# Patient Record
Sex: FEMALE | Race: WHITE | NOT HISPANIC OR LATINO | URBAN - METROPOLITAN AREA
[De-identification: names, ages, dates, MRNs, and addresses within clinical notes are randomized per-mention and may not be internally consistent; named-entity substitution may affect disease eponyms.]

---

## 2020-11-19 ENCOUNTER — OFFICE VISIT (OUTPATIENT)
Dept: URBAN - METROPOLITAN AREA CLINIC 80 | Facility: CLINIC | Age: 55
End: 2020-11-19
Payer: COMMERCIAL

## 2020-11-19 DIAGNOSIS — R10.13 EPIGASTRIC ABDOMINAL PAIN: ICD-10-CM

## 2020-11-19 DIAGNOSIS — E66.3 OVERWEIGHT: ICD-10-CM

## 2020-11-19 DIAGNOSIS — Z80.0 FAMILY HISTORY OF COLON CANCER: ICD-10-CM

## 2020-11-19 DIAGNOSIS — R12 HEARTBURN: ICD-10-CM

## 2020-11-19 PROCEDURE — G8427 DOCREV CUR MEDS BY ELIG CLIN: HCPCS | Performed by: INTERNAL MEDICINE

## 2020-11-19 PROCEDURE — G8484 FLU IMMUNIZE NO ADMIN: HCPCS | Performed by: INTERNAL MEDICINE

## 2020-11-19 PROCEDURE — 1036F TOBACCO NON-USER: CPT | Performed by: INTERNAL MEDICINE

## 2020-11-19 PROCEDURE — 3017F COLORECTAL CA SCREEN DOC REV: CPT | Performed by: INTERNAL MEDICINE

## 2020-11-19 PROCEDURE — 99214 OFFICE O/P EST MOD 30 MIN: CPT | Performed by: INTERNAL MEDICINE

## 2020-11-19 PROCEDURE — G8417 CALC BMI ABV UP PARAM F/U: HCPCS | Performed by: INTERNAL MEDICINE

## 2020-11-19 RX ORDER — PANTOPRAZOLE SODIUM 40 MG/1
1 TABLET TABLET, DELAYED RELEASE ORAL ONCE A DAY
Qty: 30 | OUTPATIENT
Start: 2020-11-19

## 2020-11-19 NOTE — HPI-TODAY'S VISIT:
56 yo F   October: epigastric pain, increased, tried to go to urgent care and Dr Dorantes- but went to [facility] ER, Gi cocktail allowed  empty stomach painful- however better when she had something on her stomach placed on her stomach:  Protonix- felt great- however when she stopped things started to resume, now is popping TUMS daily.    ?precipitant?  took 4 advil the night before it started?

## 2020-12-05 ENCOUNTER — OFFICE VISIT (OUTPATIENT)
Dept: URBAN - METROPOLITAN AREA SURGERY CENTER 19 | Facility: SURGERY CENTER | Age: 55
End: 2020-12-05
Payer: COMMERCIAL

## 2020-12-05 ENCOUNTER — CLAIMS CREATED FROM THE CLAIM WINDOW (OUTPATIENT)
Dept: URBAN - METROPOLITAN AREA CLINIC 4 | Facility: CLINIC | Age: 55
End: 2020-12-05
Payer: COMMERCIAL

## 2020-12-05 DIAGNOSIS — Z80.0 FAMILY HISTORY MALIGNANT NEOPLASM OF BILIARY TRACT: ICD-10-CM

## 2020-12-05 DIAGNOSIS — K21.9 ACID REFLUX: ICD-10-CM

## 2020-12-05 DIAGNOSIS — K21.00 GASTRO-ESOPHAGEAL REFLUX DISEASE WITH ESOPHAGITIS, WITHOUT BLEEDING: ICD-10-CM

## 2020-12-05 DIAGNOSIS — K31.89 OTHER DISEASES OF STOMACH AND DUODENUM: ICD-10-CM

## 2020-12-05 DIAGNOSIS — K31.89 ACQUIRED DEFORMITY OF DUODENUM: ICD-10-CM

## 2020-12-05 PROCEDURE — G9935 CANC NOT DETECTD DURING SRCN: HCPCS | Performed by: INTERNAL MEDICINE

## 2020-12-05 PROCEDURE — 43239 EGD BIOPSY SINGLE/MULTIPLE: CPT | Performed by: INTERNAL MEDICINE

## 2020-12-05 PROCEDURE — G8907 PT DOC NO EVENTS ON DISCHARG: HCPCS | Performed by: INTERNAL MEDICINE

## 2020-12-05 PROCEDURE — G0121 COLON CA SCRN NOT HI RSK IND: HCPCS | Performed by: INTERNAL MEDICINE

## 2020-12-05 PROCEDURE — 88312 SPECIAL STAINS GROUP 1: CPT | Performed by: PATHOLOGY

## 2020-12-05 PROCEDURE — 88305 TISSUE EXAM BY PATHOLOGIST: CPT | Performed by: PATHOLOGY

## 2020-12-05 RX ORDER — PANTOPRAZOLE SODIUM 40 MG/1
1 TABLET TABLET, DELAYED RELEASE ORAL ONCE A DAY
Qty: 30 | Status: ACTIVE | COMMUNITY
Start: 2020-11-19

## 2020-12-14 ENCOUNTER — ERX REFILL RESPONSE (OUTPATIENT)
Dept: URBAN - METROPOLITAN AREA CLINIC 80 | Facility: CLINIC | Age: 55
End: 2020-12-14

## 2020-12-14 RX ORDER — PANTOPRAZOLE SODIUM 40 MG/1
1 TABLET TABLET, DELAYED RELEASE ORAL ONCE A DAY
Qty: 30 | Refills: 0

## 2021-01-07 ENCOUNTER — ERX REFILL RESPONSE (OUTPATIENT)
Dept: URBAN - METROPOLITAN AREA CLINIC 80 | Facility: CLINIC | Age: 56
End: 2021-01-07

## 2021-01-07 RX ORDER — PANTOPRAZOLE SODIUM 40 MG/1
1 TABLET TABLET, DELAYED RELEASE ORAL ONCE A DAY
Qty: 30 | Refills: 0

## 2021-02-03 ENCOUNTER — ERX REFILL RESPONSE (OUTPATIENT)
Dept: URBAN - METROPOLITAN AREA CLINIC 80 | Facility: CLINIC | Age: 56
End: 2021-02-03

## 2021-02-03 RX ORDER — PANTOPRAZOLE SODIUM 40 MG/1
1 TABLET TABLET, DELAYED RELEASE ORAL ONCE A DAY
Qty: 30 | Refills: 0

## 2021-02-22 ENCOUNTER — ERX REFILL RESPONSE (OUTPATIENT)
Dept: URBAN - METROPOLITAN AREA CLINIC 80 | Facility: CLINIC | Age: 56
End: 2021-02-22

## 2021-02-22 RX ORDER — PANTOPRAZOLE SODIUM 40 MG/1
TAKE 1 TABLET BY MOUTH EVERY DAY TABLET, DELAYED RELEASE ORAL
Qty: 90 TABLET | Refills: 0 | OUTPATIENT

## 2021-02-22 RX ORDER — PANTOPRAZOLE SODIUM 40 MG/1
1 TABLET TABLET, DELAYED RELEASE ORAL ONCE A DAY
Qty: 30 | Refills: 0 | OUTPATIENT

## 2021-02-24 ENCOUNTER — OFFICE VISIT (OUTPATIENT)
Dept: URBAN - METROPOLITAN AREA SURGERY CENTER 19 | Facility: SURGERY CENTER | Age: 56
End: 2021-02-24

## 2021-08-27 ENCOUNTER — TELEPHONE ENCOUNTER (OUTPATIENT)
Dept: URBAN - METROPOLITAN AREA CLINIC 92 | Facility: CLINIC | Age: 56
End: 2021-08-27

## 2021-08-27 RX ORDER — PANTOPRAZOLE SODIUM 40 MG/1
TAKE 1 TABLET BY MOUTH EVERY DAY TABLET, DELAYED RELEASE ORAL
Qty: 90 TABLET | Refills: 1

## 2024-09-17 ENCOUNTER — DASHBOARD ENCOUNTERS (OUTPATIENT)
Age: 59
End: 2024-09-17

## 2024-09-17 ENCOUNTER — OFFICE VISIT (OUTPATIENT)
Dept: URBAN - METROPOLITAN AREA CLINIC 80 | Facility: CLINIC | Age: 59
End: 2024-09-17
Payer: COMMERCIAL

## 2024-09-17 VITALS
DIASTOLIC BLOOD PRESSURE: 80 MMHG | HEIGHT: 60 IN | HEART RATE: 74 BPM | TEMPERATURE: 98.1 F | SYSTOLIC BLOOD PRESSURE: 132 MMHG | BODY MASS INDEX: 27.88 KG/M2 | WEIGHT: 142 LBS

## 2024-09-17 DIAGNOSIS — K64.4 ANAL SKIN TAG: ICD-10-CM

## 2024-09-17 DIAGNOSIS — K60.2 ANAL FISSURE: ICD-10-CM

## 2024-09-17 DIAGNOSIS — K59.09 OTHER CONSTIPATION: ICD-10-CM

## 2024-09-17 PROCEDURE — 99203 OFFICE O/P NEW LOW 30 MIN: CPT | Performed by: INTERNAL MEDICINE

## 2024-09-17 RX ORDER — ROSUVASTATIN CALCIUM 40 MG/1
TABLET, FILM COATED ORAL
Qty: 90 TABLET | Status: ACTIVE | COMMUNITY

## 2024-09-17 RX ORDER — PANTOPRAZOLE SODIUM 40 MG/1
1 TABLET TABLET, DELAYED RELEASE ORAL ONCE A DAY
Qty: 30 | Refills: 0 | Status: ON HOLD | COMMUNITY

## 2024-09-17 RX ORDER — PANTOPRAZOLE SODIUM 40 MG/1
TAKE 1 TABLET BY MOUTH EVERY DAY TABLET, DELAYED RELEASE ORAL
Qty: 90 TABLET | Refills: 1 | Status: ON HOLD | COMMUNITY

## 2024-09-17 RX ORDER — LINACLOTIDE 145 UG/1
1 CAPSULE AT LEAST 30 MINUTES BEFORE THE FIRST MEAL OF THE DAY ON AN EMPTY STOMACH CAPSULE, GELATIN COATED ORAL ONCE A DAY
Qty: 30 | Refills: 1 | OUTPATIENT
Start: 2024-09-17 | End: 2024-11-15

## 2024-09-17 RX ORDER — VENLAFAXINE HYDROCHLORIDE 75 MG/1
CAPSULE, EXTENDED RELEASE ORAL
Qty: 90 CAPSULE | Status: ACTIVE | COMMUNITY

## 2024-09-17 RX ORDER — APIXABAN 5 MG/1
TAKE 1 TABLET BY MOUTH TWICE A DAY (TO PREVENT BLOOD CLOTS IN CHRONIC ATRIAL FIBRILLATION) TABLET, FILM COATED ORAL
Qty: 180 EACH | Refills: 1 | Status: ACTIVE | COMMUNITY

## 2024-09-17 RX ORDER — HYDROCORTISONE ACETATE 25 MG/1
1 SUPPOSITORY SUPPOSITORY RECTAL TWICE DAILY
Qty: 14 SUPPOSITORY | Refills: 2 | OUTPATIENT
Start: 2024-09-17 | End: 2024-10-29

## 2024-09-17 NOTE — HPI-TODAY'S VISIT:
Last Colonoscopy 2020  Has had hemorrhoids for 30 years  Semaglutide started April 2023, has lost more than 50 pounds Constipated since then  Eats prunes daily, every breakfast Taking Senna BID  Still has to push then has pain Really doesn't see blood, then area gets engorged.   On Eliquis for Afib, has had cardiac ablation 2 years ago 2022 right now doing well and Aftib subsided; however determined to stay on Eliquis for stroke prevention.   Goal is to lose another

## 2024-09-17 NOTE — PHYSICAL EXAM NECK/THYROID:
Vira Ablerts is being seen at the request of Dr. Gino Siegel for consultation due to rectal bleeding.       ASSESSMENT:  60 year old female with a history of rectal bleeding    1. Rectal bleeding, appears to be consistent with post-polypectomy bleed. Patient had a large ascending colon polyp removed 2 days ago by Dr. Guzman. Patient currently is hemodynamically stable. No significant bleeding for the last 4 hours.      PLAN:  1. Clear liquid diet  2. Plan for observation. Discussed with patient if bleeding persist or worsen. Then will plan for bowel prep and repeat colonoscopy.  3. Follow labs                  HISTORY OF PRESENT ILLNESS: Viar Alberts is a  60 year old female who presents with rectal bleeding   2 days ago, patient underwent surveillance colonoscopy with Dr. Guzman at [facility]. Had 2 polyps removed including large or ascending sessile polyp. Patient underwent 2 clips placement   Overnight patient started have multiple bright red bloody bowel movements ×3. Last one was approximately 4 hours ago. May be slightly less in amount compared to overnight.   No significant abdominal pain, fevers or chills.      Past Medical History:   Diagnosis Date   • Anxiety    • Chronic pain    • Colon polyp 2018    dr guzman   • Depression    • Essential (primary) hypertension    • High cholesterol    • Lumbar stenosis with neurogenic claudication    • Spondylolisthesis at L4-L5 level        Past Surgical History:   Procedure Laterality Date   • Bunionectomy      right foot   •  section, classic      x 1   • Colonoscopy w/ polypectomy  2018    dr guzman   • Tonsillectomy     • Tubal ligation         Current Discharge Medication List      CONTINUE these medications which have NOT CHANGED    Details   ibuprofen (MOTRIN) 200 MG tablet Take 800 mg by mouth every 6 hours as needed for Pain. Dose: 4 tablets (=800 mg)      sulindac (CLINORIL) 150 MG tablet Take 1 tablet by mouth 2 times  normal appearance , without tenderness upon palpation , no deformities , trachea midline , Thyroid normal size , no masses , thyroid nontender daily.  Qty: 60 tablet, Refills: 2      lisinopril (ZESTRIL) 10 MG tablet Take 1 tablet by mouth daily.  Qty: 90 tablet, Refills: 3      sertraline (ZOLOFT) 100 MG tablet Take 1 1/2 tablets daily  Qty: 45 tablet, Refills: 8             Family History   Problem Relation Age of Onset   • Stroke Mother    • High blood pressure Mother    • Hypertension Mother    • Diabetes Father    • Dementia/Alzheimers Father    • Heart disease Father    • COPD Brother    • Dementia/Alzheimers Paternal Grandfather        Social History     Social History   • Marital status: Single     Spouse name: N/A   • Number of children: 1   • Years of education: H.S.     Occupational History   • [employer] as a - off since Sept due to back      Social History Main Topics   • Smoking status: Light Tobacco Smoker     Packs/day: 0.50     Years: 43.00     Types: Cigarettes     Start date: 8/23/1970   • Smokeless tobacco: Never Used      Comment: smoking since 12 yr; now smoke once every 2 weeks,quit twice (longest was 2 years)   • Alcohol use No      Comment: social - special occasion; at those times 3-4 drinks   • Drug use: No   • Sexual activity: Yes     Partners: Male     Other Topics Concern   • Not on file     Social History Narrative   • No narrative on file       Review of Systems:  Constitutional:: No weight loss, no fever, no chills.  Respiratory: No shortness of breath or cough  Cardiovascular: No chest pain.  Gastroenterologic: Per History of Present Illness.       Physical exam:  Visit Vitals  BP (!) 161/98 (BP Location: [facility] [place], Patient Position: Sitting)   Pulse 80   Temp 98.9 °F (37.2 °C) (Oral)   Resp 18   Ht 5' 4\" (1.626 m)   Wt 90 kg   SpO2 99%   BMI 34.06 kg/m²     General: General appearance without acute distress.  Skin: No jaundice on inspection.  Eyes: Normal conjunctivae and lids on inspection.  Pulmonary: Clear to auscultation bilaterally, good respiratory effort.  Cardiovascular: Regular rate, no lower extremity  edema.  Abdomen: Positive bowel sound, soft, no distention, non-tender.  Psych: Mood and affect were appropriate. Judgment and insight appear appropriate.    Labs:    Recent Labs  Lab 09/13/18  0837   WBC 8.9   RBC 3.94*   HCT 34.4*   HGB 11.8*      MCV 87.3   SODIUM 142   POTASSIUM 4.4   CHLORIDE 107   CO2 27   BUN 21*   CREATININE 0.68   INR 1.0       Labs Reviewed   CBC & AUTO DIFFERENTIAL - Abnormal; Notable for the following:        Result Value    RBC 3.94 (*)     HGB 11.8 (*)     HCT 34.4 (*)     All other components within normal limits   BASIC METABOLIC PANEL - Abnormal; Notable for the following:     Glucose 122 (*)     BUN 21 (*)     BUN/Creatinine Ratio 31 (*)     All other components within normal limits   CHEM 8 PANEL - POINT OF CARE - Abnormal; Notable for the following:     CO2 Total 26 (*)     GLUCOSE  (*)     BUN POC 25 (*)     HEMATOCRIT POC 32.0 (*)     Hemoglobin POC 10.9 (*)     All other components within normal limits   PROTHROMBIN TIME   PARTIAL THROMBOPLASTIN TIME   TYPE/SCREEN   ABO & RH CONF   HEMOGLOBIN AND HEMATOCRIT   HEMOGLOBIN AND HEMATOCRIT   POCT ED ISTAT-8       No orders to display                 Place of Service: [place] [id number]     Patient: Vira Alberts     Date of procedure: 9/11/2018     Surgeon: Ike Reilly MD     Primary Physician: Colten Leavitt DO     Events:       Event Tracking           Panel 1            Procedure : COLONOSCOPY       Event Time In     Procedure Start 0848     Procedure End 0910                Scope In: 0848    At [place]: 0850                        PROCEDURE : Colonoscopy screening     Preoperative Diagnosis: Screening Colonoscopy, last colonoscopy was  10  years     Anesthesia Administered: as per Anesthesia     I have discussed the risks, benefits, and alternatives to colonoscopy with the patient [who demonstrated understanding], including but not limited to the risks of  bleeding, infection, pain, death, as well as the risks of anesthesia and perforation all leading to prolonged hospitalization, surgical intervention, or even death. I also specifically mentioned the miss rate of colonoscopy of 5-10% in the best of all circumstances. All questions were answered to the patient’s satisfaction. The patient elected to proceed with colonoscopy with intervention [i.e. polypectomy, stent placement, etc.] as indicated     Procedure Description: The patient was placed in the left lateral position and monitored continuously through ECG tracing, pulse oximetry monitoring and direct observations. Medications were administered incrementally over the course of the procedure to achieve an adequate level of conscious sedation. After anorectal examination was performed, the Olympus ACF-190AL was inserted into the rectum and advanced under direct vision to the cecum, which was identified by IC valve and appendiceal orifice. The procedure was considered not difficult..       During withdrawal examination, the final quality of the prep was Berlin bowel prep scale Right colon: .3- (entire mucosa of colon segment seen well, with no residual staining, small fragments of stool, or opaque liquid)  Transverse: 3- (entire mucosa of colon segment seen well, with no residual staining, small fragments of stool, or opaque liquid)  Left colon:3- (entire mucosa of colon segment seen well, with no residual staining, small fragments of stool, or opaque liquid)     A careful inspection was made as the colonoscope was withdrawn, including a retroflexed view of the rectum, findings and interventions are described below. Appropriate photo documentation was obtained.     Cecal withdrawal time :18 mins     Overall Vira Alberts tolerated the procedure well, without undue discomfort, hypotension or desaturation. The patient was adequately recovered in the endoscopy suite and was transported to  PACU.     Findings:  Anorectal: Internal hemorrhoids  Terminal ileum: Not examined  Cecum: Normal mucosa with preserve vascularity, absence of ulcers,erythema, or friability  Ascending colon: 1 Polyps size ranging from 20 mm removed by endoscopic hot snare resection after injecting combination of methylene blue and normal saline into the submucosa creating a pseudopolyp , 2 clips placed, one clean based ulcer, biopsies obtained  Transverse colon: Normal mucosa with preserve vascularity, absence of ulcers,erythema, or friability  Descending colon: Normal mucosa with preserve vascularity, absence of ulcers,erythema, or friability  Sigmoid colon: 1 Polyps size ranging from 6 mm removed by cold snare polypectomy   Rectum: Normal mucosa with preserve vascularity, absence of ulcers,erythema, or friability              Complications: none     Impression:  Polyp(s)  and hemorroids                  Recommendations  · Avoid aspirin and NSAIDs for 14 days, If polyp is adenomatous, patient will need a repeat colonoscopy in 3 year(s)., Start benifiber daily. and Follow up with me in  4  weeks in office for hemorrhoidal banding if symptomatic.

## 2024-11-25 ENCOUNTER — TELEPHONE ENCOUNTER (OUTPATIENT)
Dept: URBAN - METROPOLITAN AREA CLINIC 80 | Facility: CLINIC | Age: 59
End: 2024-11-25

## 2024-11-25 RX ORDER — LINACLOTIDE 145 UG/1
1 CAPSULE AT LEAST 30 MINUTES BEFORE THE FIRST MEAL OF THE DAY ON AN EMPTY STOMACH CAPSULE, GELATIN COATED ORAL ONCE A DAY
Qty: 30 | Refills: 5
Start: 2024-09-17 | End: 2025-05-24

## 2025-05-21 ENCOUNTER — TELEPHONE ENCOUNTER (OUTPATIENT)
Dept: URBAN - METROPOLITAN AREA CLINIC 80 | Facility: CLINIC | Age: 60
End: 2025-05-21

## 2025-05-21 RX ORDER — LINACLOTIDE 145 UG/1
1 CAPSULE AT LEAST 30 MINUTES BEFORE THE FIRST MEAL OF THE DAY ON AN EMPTY STOMACH CAPSULE, GELATIN COATED ORAL ONCE A DAY
Qty: 90 | Refills: 1
Start: 2024-09-17 | End: 2025-11-17

## 2025-07-02 ENCOUNTER — TELEPHONE ENCOUNTER (OUTPATIENT)
Dept: URBAN - METROPOLITAN AREA CLINIC 80 | Facility: CLINIC | Age: 60
End: 2025-07-02